# Patient Record
Sex: MALE | Race: WHITE | Employment: FULL TIME | URBAN - METROPOLITAN AREA
[De-identification: names, ages, dates, MRNs, and addresses within clinical notes are randomized per-mention and may not be internally consistent; named-entity substitution may affect disease eponyms.]

---

## 2021-08-06 ENCOUNTER — HOSPITAL ENCOUNTER (EMERGENCY)
Age: 51
Discharge: HOME | End: 2021-08-07
Attending: EMERGENCY MEDICINE
Payer: COMMERCIAL

## 2021-08-06 VITALS
OXYGEN SATURATION: 100 % | HEIGHT: 73 IN | WEIGHT: 254.19 LBS | TEMPERATURE: 98.9 F | BODY MASS INDEX: 33.69 KG/M2 | HEART RATE: 92 BPM | RESPIRATION RATE: 18 BRPM | SYSTOLIC BLOOD PRESSURE: 188 MMHG | DIASTOLIC BLOOD PRESSURE: 96 MMHG

## 2021-08-06 DIAGNOSIS — T18.128A ESOPHAGEAL OBSTRUCTION DUE TO FOOD IMPACTION: Primary | ICD-10-CM

## 2021-08-06 DIAGNOSIS — R03.0 ELEVATED BLOOD PRESSURE READING: ICD-10-CM

## 2021-08-06 DIAGNOSIS — R13.19 ESOPHAGEAL DYSPHAGIA: ICD-10-CM

## 2021-08-06 DIAGNOSIS — K22.2 ESOPHAGEAL OBSTRUCTION DUE TO FOOD IMPACTION: Primary | ICD-10-CM

## 2021-08-06 PROCEDURE — 99282 EMERGENCY DEPT VISIT SF MDM: CPT

## 2021-08-07 RX ORDER — FAMOTIDINE 20 MG/1
20 TABLET, FILM COATED ORAL 2 TIMES DAILY
Qty: 20 TABLET | Refills: 0 | Status: SHIPPED | OUTPATIENT
Start: 2021-08-07 | End: 2021-08-17

## 2021-08-07 NOTE — ED PROVIDER NOTES
EMERGENCY DEPARTMENT HISTORY AND PHYSICAL EXAM      Please note that this dictation was completed with the assistance of \"Dragon\", the computer voice recognition software. Quite often unanticipated grammatical, syntax, homophones, and other interpretive errors are inadvertently transcribed by the computer software. Please disregard these errors and any errors that have escaped final proofreading. Thank you. Patient Name: Maikel Schafer  : 1970  MRN: [id number]  History of Presenting Illness     Chief Complaint   Patient presents with    Foreign Body Swallowed     pt reports he was eating steak tonight and felt like the food is stuck in his throat, pt reports difficulty swallowing however denies difficulty breathing at this time. pt reports nausea however states he is unable to vomit     History Provided By: Patient    HPI: Maikel Schafer, 45 y.o. male with past medical history as documented below presents to the ED with c/o of acute onset of dysphagia and concerns for food bolus impaction. Pt states he ate steak tonight and felt like piece of steak is still stuck in esophagus. Denies SOB or difficulty tolerating secretions. Pt denies prior hx of the same. Pt denies any other exacerbating or ameliorating factors. Additionally, pt specifically denies any recent fever, chills, headache, nausea, vomiting, abdominal pain, CP, SOB, lightheadedness, dizziness, numbness, weakness, lower extremity swelling, heart palpitations, urinary sxs, diarrhea, constipation, melena, hematochezia, cough, or congestion. There are no other complaints, changes or physical findings pertinent to the HPI at this time. PCP: None    Past History   Past Medical History:  Denies    Past Surgical History:  Denies    Family History:  Denies    Social History:  Social History     Tobacco Use    Smoking status: Not on file   Substance Use Topics    Alcohol use: Not on file    Drug use: Not on file       Allergies:   Allergies Allergen Reactions    Pcn [Penicillins] Other (comments)     \"Convulsions\"       Current Medications:  No current facility-administered medications on file prior to encounter. No current outpatient medications on file prior to encounter. Review of Systems   A complete ROS was reviewed by me today and was negative, unless otherwise specified below:  Review of Systems   Constitutional: Negative. Negative for chills and fever. HENT: Positive for trouble swallowing. Negative for congestion and sore throat. Eyes: Negative. Respiratory: Negative. Negative for cough, chest tightness, shortness of breath and wheezing. Cardiovascular: Negative. Negative for chest pain, palpitations and leg swelling. Gastrointestinal: Negative. Negative for abdominal distention, abdominal pain, blood in stool, constipation, diarrhea, nausea and vomiting. Endocrine: Negative. Genitourinary: Negative. Negative for dysuria, flank pain, frequency, hematuria and urgency. Musculoskeletal: Negative. Negative for arthralgias, back pain and myalgias. Skin: Negative. Negative for color change and rash. Neurological: Negative. Negative for dizziness, syncope, speech difficulty, weakness, light-headedness, numbness and headaches. Hematological: Negative. Psychiatric/Behavioral: Negative. Negative for confusion and self-injury. The patient is not nervous/anxious. All other systems reviewed and are negative. Physical Exam   Physical Exam  Vitals and nursing note reviewed. Constitutional:       Appearance: He is well-developed. He is not toxic-appearing. HENT:      Head: Normocephalic and atraumatic. Mouth/Throat:      Pharynx: No posterior oropharyngeal erythema. Eyes:      Conjunctiva/sclera: Conjunctivae normal.   Cardiovascular:      Rate and Rhythm: Normal rate and regular rhythm. Heart sounds: Normal heart sounds. No murmur heard. No friction rub. No gallop.     Pulmonary: Effort: Pulmonary effort is normal. No respiratory distress. Breath sounds: Normal breath sounds. No wheezing or rales. Chest:      Chest wall: No tenderness. Abdominal:      General: Bowel sounds are normal. There is no distension. Palpations: Abdomen is soft. There is no mass. Tenderness: There is no abdominal tenderness. There is no guarding or rebound. Musculoskeletal:         General: Normal range of motion. Cervical back: Normal range of motion. Skin:     General: Skin is warm. Neurological:      General: No focal deficit present. Mental Status: He is alert and oriented to person, place, and time. Motor: No abnormal muscle tone. Psychiatric:         Behavior: Behavior is cooperative. Diagnostic Study Results     Labs -   I have personally reviewed and interpreted all available laboratory results. No results found for this or any previous visit (from the past 24 hour(s)). Radiologic Studies -   I have personally reviewed and interpreted all available imaging studies and agree with radiology interpretation and report. No orders to display     CT Results  (Last 48 hours)    None        CXR Results  (Last 48 hours)    None          Medical Decision Making   I reviewed the vital signs, available nursing notes, past medical history, past surgical history, family history and social history. Vital Signs-Reviewed the patient's vital signs.   Patient Vitals for the past 24 hrs:   Temp Pulse Resp BP SpO2   08/06/21 2244 98.9 °F (37.2 °C) 92 18 (!) 188/96 100 %     Pulse Oximetry Analysis - 100% on RA    Cardiac Monitor:   Rate: 92 bpm  The cardiac monitor revealed the following rhythm as interpreted by me: Normal Sinus Rhythm    The cardiac monitor was ordered secondary to the patient's history of dysphagia and to monitor the patient for dysrhythmia    Records Reviewed: Nursing Notes, Old Medical Records, Previous electrocardiograms, Previous Radiology Studies and Previous Laboratory Studies    Provider Notes (Medical Decision Making):   Pt presenting with dysphagia, concerns for esophageal impaction. Pt drank several cans of carbonated drinks and was able to swallow without difficulty. He did vomit up a piece of steak and had immediate relief of sx's. VSS for discharge. Will provide GI follow-up for outpatient EGD. ED Course:   I am the first physician for this patient's ED visit today. Initial assessment performed. I discussed presenting problems, concerns and my formulated plan for today's visit with the patient and any available family members at bedside. I encouraged them to ask questions as they arise throughout the visit. Social History     Tobacco Use    Smoking status: Not on file   Substance Use Topics    Alcohol use: Not on file    Drug use: Not on file       I reviewed our electronic medical record system for any past medical records that were available that may contribute to the patient's current condition, the nursing notes and vital signs from today's visit. ED Orders Placed :  Orders Placed This Encounter    DISCONTD: glucagon (GLUCAGEN) injection 1 mg    DISCONTD: sod bicarb-citric ac-simeth (EZ GAS II) 2.21-1.53 gram/4 gram contrast packet 4 g    famotidine (Pepcid) 20 mg tablet       ED Medications Administered:  Medications - No data to display      Progress Note:  I have re-examined the patient. Pt states he feels much better and symptoms improved. Tolerating oral intake. Abdomen is soft and without guarding, rebound or other peritoneal signs. I have discussed with patient the importance of close f/u and to return to the ED if symptoms don't improve or worsen. Progress Note:  Patient has been reassessed and reports feeling better and symptoms have improved significantly after ED treatment. Marcel Membreno's final labs and imaging have been reviewed with him and available family and/or caregiver.  They have been counseled regarding his diagnosis. He verbally conveys understanding and agreement of the signs, symptoms, diagnosis, treatment and prognosis and additionally agrees to follow up as recommended with Dr. None and/or specialist in 24 - 48 hours. He also agrees with the care-plan we created together and conveys that all of his questions have been answered. I have also put together a packet of discharge instructions for him that include: 1) educational information regarding their diagnosis, 2) how to care for their diagnosis at home, as well a 3) list of reasons why they would want to return to the ED prior to their follow-up appointment should the patient's condition change or symptoms worsen. I have answered all questions to the patient's satisfaction. Strict return precautions given. He both understood and agreed with plan as discussed. Vital signs stable for discharge. Disposition:   DISCHARGE  The pt is ready for discharge. The pt's signs, symptoms, diagnosis, and discharge instructions have been discussed and pt has conveyed their understanding. The pt is to follow up as recommended or return to ER should their symptoms worsen. Plan has been discussed and pt is in agreement. Plan:  1. Return precautions as discussed with patient and available family and/or caregiver. 2.   Discharge Medication List as of 8/7/2021 12:38 AM      No current facility-administered medications for this encounter. No current outpatient medications on file.     3.   Follow-up Information     Follow up With Specialties Details Why Contact Info    \[facility]\"" EMERGENCY DEPT Emergency Medicine  If symptoms worsen, As needed [address] [place] [address]  [phone]    Kimberley Gibson MD Gastroenterology  As needed, If symptoms worsen [place] [id number] [address] [address]  [phone]    [facility] EMERGENCY DEPT Emergency Medicine   [address] [place] [address]  [phone]          Instructed to return to ED if worse  Diagnosis   Clinical Impression:  1. Esophageal obstruction due to food impaction    2. Esophageal dysphagia    3. Elevated blood pressure reading        Attestation:  Steven Morrison MD, am the attending of record for this patient. I personally performed the services described in this documentation on this date, 8/6/2021 for patient, Susy Sharma. I have reviewed the chart and verified that the record is accurate and complete.

## 2021-08-07 NOTE — DISCHARGE INSTRUCTIONS
Thank you for allowing us to take care of you today! In this packet, I have included a copy of all your lab results and/or radiologic studies so you can have them easily available at your follow-up visit. We hope we addressed all of your concerns and needs. We strive to provide excellent quality care in the Emergency Department. You will receive a survey after your visit to evaluate the care you were provided. It was a pleasure serving you. Should you receive a survey from us, we invite you to share your experience with us. The exam and treatment you received in the Emergency Department were for an urgent problem and are not intended as complete care. It is important that you follow up with a doctor, nurse practitioner, or physician assistant for ongoing care. If your symptoms become worse or you do not improve as expected and you are unable to reach your usual health care provider, you should return to the Emergency Department. We are available 24 hours a day. Please take your discharge instructions with you when you go to your follow-up appointment. If you have any problem arranging a follow-up appointment, contact the Emergency Department immediately. If a prescription has been provided, please have it filled as soon as possible to prevent a delay in treatment. Read the entire medication instruction sheet provided to you by the pharmacy. If you have any questions or reservations about taking the medication due to side effects or interactions with other medications, please call your primary care physician or contact the ER to speak with the charge nurse. Make an appointment with your family doctor or the physician you were referred to for follow-up of this visit as instructed on your discharge paperwork. Return to the ER if you are unable to be seen or if you are unable to be seen in a timely manner.     If you have any problem arranging the follow-up visit, contact the Emergency Department immediately. I hope you feel better and thank you again for allowing us to provide you with excellent care today at [place] [address].! Warmest regards,    Lynnette Moreno MD  Emergency Medicine Physician  [place] [address].  _____________________________________________________________________________________________________________    Vitals:    08/06/21 2244   BP: (!) 188/96   BP 1 Location: Left arm   BP Patient Position: Sitting   Pulse: 92   Temp: 98.9 °F (37.2 °C)   Resp: 18   Height: 6' 1\" (1.854 m)   Weight: 115.3 kg (254 lb 3.1 oz)   SpO2: 100%       No results found for this or any previous visit (from the past 12 hour(s)).     No orders to display     CT Results  (Last 48 hours)      None            Local Primary Care Physicians   [facility] [phone]  MD Kemi Cruz, MD Keron Aj MD [facility] [phone]  Stormy Hopper, [facility]  MD Norma Tillye MD Emogene Harrow, MD [address]  [phone]  MD Srini Sanabria MD [address] [facility] [phone]  Rosaline Jones, MD Nasim Bhakta, MD Vince Silveira, MD Leonidas Portillo MD   [facility] [phone]  BABATUNDE RXLADB JASS, MD Art Cooper, MD Tori Ledesma, NP [address] [phone]  Eldon Hendricks, MD Steve Esparza, MD Nely Nguyen, MD Eunice Clifford, MD Marquise Dumont, MD Landon Garcia, MD Jan Bee MD   [facility] [place] [phone]  Farhana Alvarez MD [facility] [phone]  Heather Das, MD Jesse Proctor, NP  Kofi Marcelino, MD Pat Willis, MD Armond Snow, MD Gabi Orosco, MD Myrna Crump, MD   [address] [facility] [phone]  Hartford Goltz, MD Jim Robles, P  Sharan Velazquez, NP  Deana Melendrez, MD Viky Beth, MD Kaci Martínez, MD Therese Chua MD [facility] [phone]  MD Eneida Gallardo MD Lowery Banker, MD Suzann Handing, MD Parry Dutch, MD   [facility] [phone]  Gerardine Rasp, MD Lamona Opitz, MD [place] [phone]  MD Taryn Lazo MD Mevelyn Boatman, MD   [facility] [phone]  MD Stephanie Gooden, MD Roberto Carlos Sawant MD June Katz, MD Eldon Torres, NP  David Faria MD [address] [place] [address]   [phone]  Aleksey Montoya, MD Shon Ortiz, MD Scar Silva MD     [address] [facility] [phone]  Dean Wolfe, MD Atilio Thorpe, FNP  Pako Zuñiga, PAVINEET Zuñiga, FNP  Cristina Decker, MD Cookie Gibson, CORDELIA Powell DO   Miscellaneous:  Keenan Shaffer MD [facility] Clinic Departments   For adult and child immunizations, family planning, TB screening, STD testing and women's health services. [facility]: [place] [phone]     [address]: [address] [phone]     [address] [place] [address]        [address] [place] [address]  For primary care services, woman and child wellness, and some clinics providing specialty care. [facility] -- [address] [facility] [phone]   [address] [facility] [address] 3614 [facility] [phone]   [address] [facility] [address] [phone]   [address]  [address] [place] [address] [address] [place] [address]  [phone] [address] [facility] [address]  [address] [place] [phone]   [address] [phone]   [facility] [address], [address] [place] [address]   [facility]: [address] [facility]     [place] [address]reach [address] [phone]   [employer]  [address] ([email or web address]         Sexual Health/Woman Wellness Clinics   For STD/HIV testing and treatment, pregnancy testing and services, men's health, birth control services, LGBT services, and hepatitis/HPV vaccine services. [facility] [employer] [address] [facility] 75 [facility] 157 600 [facility] [phone]   [facility] [address] 5th floor [phone]   Pregnancy [address] [facility] [address]  Jason Malachi [phone]        [place] [address] [facility]   [phone]   [place]   [phone]   Women, Infant and Children's Services: [address] [phone]       7[address] [facility] 121 Intervention   [phone]   [facility]00 [facility] 16.   1212 [facility]

## 2021-08-07 NOTE — ED NOTES
Patient given printed discharge instructions reviewed by the MD. Patient understands instructions/follow up recommendations. Patient ambulated out of ED with wife at his side.